# Patient Record
Sex: MALE | Race: BLACK OR AFRICAN AMERICAN | NOT HISPANIC OR LATINO | Employment: OTHER | URBAN - METROPOLITAN AREA
[De-identification: names, ages, dates, MRNs, and addresses within clinical notes are randomized per-mention and may not be internally consistent; named-entity substitution may affect disease eponyms.]

---

## 2025-07-11 ENCOUNTER — HOSPITAL ENCOUNTER (EMERGENCY)
Facility: HOSPITAL | Age: 70
Discharge: PSYCHIATRIC OR SUBSTANCE USE FACILITY | End: 2025-07-12
Attending: EMERGENCY MEDICINE

## 2025-07-11 ENCOUNTER — APPOINTMENT (OUTPATIENT)
Dept: CARDIOLOGY | Facility: HOSPITAL | Age: 70
End: 2025-07-11

## 2025-07-11 VITALS
HEART RATE: 84 BPM | SYSTOLIC BLOOD PRESSURE: 157 MMHG | OXYGEN SATURATION: 96 % | HEIGHT: 66 IN | WEIGHT: 212 LBS | BODY MASS INDEX: 34.07 KG/M2 | DIASTOLIC BLOOD PRESSURE: 90 MMHG | RESPIRATION RATE: 18 BRPM | TEMPERATURE: 97.3 F

## 2025-07-11 DIAGNOSIS — R45.851 DEPRESSION WITH SUICIDAL IDEATION: Primary | ICD-10-CM

## 2025-07-11 DIAGNOSIS — R45.851 SUICIDAL IDEATION: ICD-10-CM

## 2025-07-11 DIAGNOSIS — F32.A DEPRESSION WITH SUICIDAL IDEATION: Primary | ICD-10-CM

## 2025-07-11 LAB
ALBUMIN SERPL BCP-MCNC: 4 G/DL (ref 3.4–5)
ALP SERPL-CCNC: 65 U/L (ref 33–136)
ALT SERPL W P-5'-P-CCNC: 30 U/L (ref 10–52)
AMPHETAMINES UR QL SCN: NORMAL
ANION GAP SERPL CALCULATED.3IONS-SCNC: 10 MMOL/L (ref 10–20)
APAP SERPL-MCNC: <10 UG/ML (ref ?–30)
APPEARANCE UR: CLEAR
AST SERPL W P-5'-P-CCNC: 19 U/L (ref 9–39)
BARBITURATES UR QL SCN: NORMAL
BASOPHILS # BLD AUTO: 0.03 X10*3/UL (ref 0–0.1)
BASOPHILS NFR BLD AUTO: 0.4 %
BENZODIAZ UR QL SCN: NORMAL
BILIRUB SERPL-MCNC: 0.3 MG/DL (ref 0–1.2)
BILIRUB UR STRIP.AUTO-MCNC: NEGATIVE MG/DL
BUN SERPL-MCNC: 15 MG/DL (ref 6–23)
BZE UR QL SCN: NORMAL
CALCIUM SERPL-MCNC: 9.1 MG/DL (ref 8.6–10.3)
CANNABINOIDS UR QL SCN: NORMAL
CHLORIDE SERPL-SCNC: 106 MMOL/L (ref 98–107)
CO2 SERPL-SCNC: 25 MMOL/L (ref 21–32)
COLOR UR: NORMAL
CREAT SERPL-MCNC: 0.85 MG/DL (ref 0.5–1.3)
EGFRCR SERPLBLD CKD-EPI 2021: >90 ML/MIN/1.73M*2
EOSINOPHIL # BLD AUTO: 0.2 X10*3/UL (ref 0–0.7)
EOSINOPHIL NFR BLD AUTO: 2.6 %
ERYTHROCYTE [DISTWIDTH] IN BLOOD BY AUTOMATED COUNT: 13.2 % (ref 11.5–14.5)
ETHANOL SERPL-MCNC: <10 MG/DL
FENTANYL+NORFENTANYL UR QL SCN: NORMAL
GLUCOSE SERPL-MCNC: 100 MG/DL (ref 74–99)
GLUCOSE UR STRIP.AUTO-MCNC: NORMAL MG/DL
HCT VFR BLD AUTO: 40 % (ref 41–52)
HGB BLD-MCNC: 13.1 G/DL (ref 13.5–17.5)
IMM GRANULOCYTES # BLD AUTO: 0.01 X10*3/UL (ref 0–0.7)
IMM GRANULOCYTES NFR BLD AUTO: 0.1 % (ref 0–0.9)
KETONES UR STRIP.AUTO-MCNC: NEGATIVE MG/DL
LEUKOCYTE ESTERASE UR QL STRIP.AUTO: NEGATIVE
LYMPHOCYTES # BLD AUTO: 2.8 X10*3/UL (ref 1.2–4.8)
LYMPHOCYTES NFR BLD AUTO: 36.8 %
MCH RBC QN AUTO: 27.9 PG (ref 26–34)
MCHC RBC AUTO-ENTMCNC: 32.8 G/DL (ref 32–36)
MCV RBC AUTO: 85 FL (ref 80–100)
METHADONE UR QL SCN: NORMAL
MONOCYTES # BLD AUTO: 0.81 X10*3/UL (ref 0.1–1)
MONOCYTES NFR BLD AUTO: 10.7 %
NEUTROPHILS # BLD AUTO: 3.75 X10*3/UL (ref 1.2–7.7)
NEUTROPHILS NFR BLD AUTO: 49.4 %
NITRITE UR QL STRIP.AUTO: NEGATIVE
NRBC BLD-RTO: 0 /100 WBCS (ref 0–0)
OPIATES UR QL SCN: NORMAL
OXYCODONE+OXYMORPHONE UR QL SCN: NORMAL
PCP UR QL SCN: NORMAL
PH UR STRIP.AUTO: 7 [PH]
PLATELET # BLD AUTO: 169 X10*3/UL (ref 150–450)
POTASSIUM SERPL-SCNC: 4.1 MMOL/L (ref 3.5–5.3)
PROT SERPL-MCNC: 7.1 G/DL (ref 6.4–8.2)
PROT UR STRIP.AUTO-MCNC: NEGATIVE MG/DL
RBC # BLD AUTO: 4.7 X10*6/UL (ref 4.5–5.9)
RBC # UR STRIP.AUTO: NEGATIVE MG/DL
SALICYLATES SERPL-MCNC: <3 MG/DL (ref ?–20)
SODIUM SERPL-SCNC: 137 MMOL/L (ref 136–145)
SP GR UR STRIP.AUTO: 1.02
UROBILINOGEN UR STRIP.AUTO-MCNC: NORMAL MG/DL
WBC # BLD AUTO: 7.6 X10*3/UL (ref 4.4–11.3)

## 2025-07-11 PROCEDURE — 36415 COLL VENOUS BLD VENIPUNCTURE: CPT

## 2025-07-11 PROCEDURE — 80053 COMPREHEN METABOLIC PANEL: CPT

## 2025-07-11 PROCEDURE — 93005 ELECTROCARDIOGRAM TRACING: CPT

## 2025-07-11 PROCEDURE — 81003 URINALYSIS AUTO W/O SCOPE: CPT

## 2025-07-11 PROCEDURE — 85025 COMPLETE CBC W/AUTO DIFF WBC: CPT

## 2025-07-11 PROCEDURE — 99285 EMERGENCY DEPT VISIT HI MDM: CPT | Performed by: EMERGENCY MEDICINE

## 2025-07-11 PROCEDURE — 80307 DRUG TEST PRSMV CHEM ANLYZR: CPT

## 2025-07-11 PROCEDURE — 80320 DRUG SCREEN QUANTALCOHOLS: CPT

## 2025-07-11 SDOH — HEALTH STABILITY: MENTAL HEALTH: IN THE PAST WEEK, HAVE YOU BEEN HAVING THOUGHTS ABOUT KILLING YOURSELF?: YES

## 2025-07-11 SDOH — HEALTH STABILITY: MENTAL HEALTH: ANXIETY SYMPTOMS: GENERALIZED

## 2025-07-11 SDOH — HEALTH STABILITY: MENTAL HEALTH: IN THE PAST FEW WEEKS, HAVE YOU FELT THAT YOU OR YOUR FAMILY WOULD BE BETTER OFF IF YOU WERE DEAD?: YES

## 2025-07-11 SDOH — HEALTH STABILITY: MENTAL HEALTH: ARE YOU HAVING THOUGHTS OF KILLING YOURSELF RIGHT NOW?: YES

## 2025-07-11 SDOH — HEALTH STABILITY: MENTAL HEALTH: WISH TO BE DEAD (PAST 1 MONTH): YES

## 2025-07-11 SDOH — HEALTH STABILITY: MENTAL HEALTH: SUICIDAL BEHAVIOR (LIFETIME): NO

## 2025-07-11 SDOH — HEALTH STABILITY: MENTAL HEALTH: HAVE YOU EVER TRIED TO KILL YOURSELF?: NO

## 2025-07-11 SDOH — HEALTH STABILITY: MENTAL HEALTH: ACTIVE SUICIDAL IDEATION WITH SOME INTENT TO ACT, WITHOUT SPECIFIC PLAN (PAST 1 MONTH): NO

## 2025-07-11 SDOH — HEALTH STABILITY: MENTAL HEALTH: ACTIVE SUICIDAL IDEATION WITH SPECIFIC PLAN AND INTENT (PAST 1 MONTH): NO

## 2025-07-11 SDOH — HEALTH STABILITY: MENTAL HEALTH: IN THE PAST FEW WEEKS, HAVE YOU WISHED YOU WERE DEAD?: YES

## 2025-07-11 SDOH — HEALTH STABILITY: MENTAL HEALTH: NON-SPECIFIC ACTIVE SUICIDAL THOUGHTS (PAST 1 MONTH): YES

## 2025-07-11 SDOH — HEALTH STABILITY: MENTAL HEALTH
DEPRESSION SYMPTOMS: APPETITE CHANGE;SLEEP DISTURBANCE;FEELINGS OF HELPLESSNESS;FEELINGS OF HOPELESSESS;LOSS OF INTEREST;CHANGE IN ENERGY LEVEL

## 2025-07-11 SDOH — ECONOMIC STABILITY: HOUSING INSECURITY: FEELS SAFE LIVING IN HOME: YES

## 2025-07-11 ASSESSMENT — LIFESTYLE VARIABLES
PRESCIPTION_ABUSE_PAST_12_MONTHS: NO
SUBSTANCE_ABUSE_PAST_12_MONTHS: NO

## 2025-07-11 NOTE — ED PROVIDER NOTES
"HPI   Chief Complaint   Patient presents with    Suicidal       HPI  Patient is a 70-year-old male with history of anxiety depression and PTSD pink slipped [facility].  She reports she did have a follow-up visit with the patient this morning and he was very reserved and Stating \"he is not worthy of life\" and states that \"he has no will to live.\"  He reports thoughts of wanting to hurt himself but no active plan.  She reports that he was somewhat slow to speak and she is concerned that his depression is worsening to the point where he requires inpatient psychiatric hospitalization as she is concerned that he may act impulsively on his thoughts.  He otherwise denies any cough congestion recent fevers chills chest pain shortness of breath abdominal pain nausea vomiting or diarrhea.  He reports overall no desire to live but no active plan for suicide at this time.      Patient History   Medical History[1]  Surgical History[2]  Family History[3]  Social History[4]    Physical Exam   ED Triage Vitals [07/11/25 1352]   Temperature Heart Rate Respirations BP   36.8 °C (98.2 °F) 77 20 (!) 185/92      Pulse Ox Temp src Heart Rate Source Patient Position   96 % -- -- --      BP Location FiO2 (%)     -- --       Physical Exam  Vitals and nursing note reviewed.   Constitutional:       General: He is not in acute distress.     Appearance: He is well-developed.   HENT:      Head: Normocephalic and atraumatic.   Eyes:      Conjunctiva/sclera: Conjunctivae normal.   Cardiovascular:      Rate and Rhythm: Normal rate and regular rhythm.      Heart sounds: No murmur heard.  Pulmonary:      Effort: Pulmonary effort is normal. No respiratory distress.      Breath sounds: Normal breath sounds.   Abdominal:      Palpations: Abdomen is soft.      Tenderness: There is no abdominal tenderness.   Musculoskeletal:         General: No swelling.      Cervical back: Neck supple.   Skin:     " "General: Skin is warm and dry.      Capillary Refill: Capillary refill takes less than 2 seconds.   Neurological:      Mental Status: He is alert.   Psychiatric:      Comments: Depressed mood           ED Course & MDM   ED Course as of 07/13/25 1608   Fri Jul 11, 2025   1400 I did speak with the [facility] who did [facility] him due to worsening depression she is concerned he Persistently stating that he \"is not worthy\" and \"no longer wants to live\" she states he did not express that he had a plan but he was very depressed and she feels that he is at high risk for impulsive behaviors and requires higher care at this time. [JJ]   1506 Twelve-lead EKG notes normal sinus rhythm 74 beats minute.  First-degree AV block.  Nonspecific T wave flattening inferiorly but no ST elevations or depressions [ML]      ED Course User Index  [JJ] Faviola Muñoz PA-C  [ML] Marty R Lejeune,          Diagnoses as of 07/13/25 1608   Depression with suicidal ideation   Suicidal ideation                 No data recorded     Gab Coma Scale Score: 15 (07/11/25 1348 : Cheyenne Duran LPN)                           Medical Decision Making  Parts of this chart have been completed using voice recognition software. Please excuse any errors of transcription.  My thought process and reason for plan has been formulated from the time that I saw the patient until the time of disposition and is not specific to one specific moment during their visit and furthermore my MDM encompasses this entire chart and not only this text box.      HPI: Detailed above.    Exam: A medically appropriate exam performed, outlined above, given the known history and presentation.    History obtained from: Patient    EKG: Interpreted by attending physician and reviewed by me    Social Determinants of Health considered during this visit: Lives independently, pink slipped    Medications given during visit:  Medications - No data to display "     Diagnostic/tests  Labs Reviewed   CBC WITH AUTO DIFFERENTIAL - Abnormal       Result Value    WBC 7.6      nRBC 0.0      RBC 4.70      Hemoglobin 13.1 (*)     Hematocrit 40.0 (*)     MCV 85      MCH 27.9      MCHC 32.8      RDW 13.2      Platelets 169      Neutrophils % 49.4      Immature Granulocytes %, Automated 0.1      Lymphocytes % 36.8      Monocytes % 10.7      Eosinophils % 2.6      Basophils % 0.4      Neutrophils Absolute 3.75      Immature Granulocytes Absolute, Automated 0.01      Lymphocytes Absolute 2.80      Monocytes Absolute 0.81      Eosinophils Absolute 0.20      Basophils Absolute 0.03     COMPREHENSIVE METABOLIC PANEL - Abnormal    Glucose 100 (*)     Sodium 137      Potassium 4.1      Chloride 106      Bicarbonate 25      Anion Gap 10      Urea Nitrogen 15      Creatinine 0.85      eGFR >90      Calcium 9.1      Albumin 4.0      Alkaline Phosphatase 65      Total Protein 7.1      AST 19      Bilirubin, Total 0.3      ALT 30     DRUG SCREEN,URINE - Normal    Amphetamine Screen, Urine Presumptive Negative      Barbiturate Screen, Urine Presumptive Negative      Benzodiazepines Screen, Urine Presumptive Negative      Cannabinoid Screen, Urine Presumptive Negative      Cocaine Metabolite Screen, Urine Presumptive Negative      Fentanyl Screen, Urine Presumptive Negative      Opiate Screen, Urine Presumptive Negative      Oxycodone Screen, Urine Presumptive Negative      PCP Screen, Urine Presumptive Negative      Methadone Screen, Urine Presumptive Negative      Narrative:     Drug screen results are presumptive and should not be used to assess   compliance with prescribed medication. Contact the performing [facility] laboratory   to add-on definitive confirmatory testing if clinically indicated.    Toxicology screening results are reported qualitatively. The concentration must   be greater than or equal to the cutoff to be reported as positive. The concentration   at which the screening test can  detect an individual drug or metabolite varies.   The absence of expected drug(s) and/or drug metabolite(s) may indicate non-compliance,   inappropriate timing of specimen collection relative to drug administration, poor drug   absorption, diluted/adulterated urine, or limitations of testing. For medical purposes   only; not valid for forensic use.    Interpretive questions should be directed to the laboratory medical directors.   ACUTE TOXICOLOGY PANEL, BLOOD - Normal    Acetaminophen <10.0      Salicylate  <3      Alcohol <10     URINALYSIS WITH REFLEX CULTURE AND MICROSCOPIC - Normal    Color, Urine Light-Yellow      Appearance, Urine Clear      Specific Gravity, Urine 1.022      pH, Urine 7.0      Protein, Urine NEGATIVE      Glucose, Urine Normal      Blood, Urine NEGATIVE      Ketones, Urine NEGATIVE      Bilirubin, Urine NEGATIVE      Urobilinogen, Urine Normal      Nitrite, Urine NEGATIVE      Leukocyte Esterase, Urine NEGATIVE     URINALYSIS WITH REFLEX CULTURE AND MICROSCOPIC    Narrative:     The following orders were created for panel order Urinalysis with Reflex Culture and Microscopic.  Procedure                               Abnormality         Status                     ---------                               -----------         ------                     Urinalysis with Reflex Cu...[[id number]]  Normal              Final result               Extra Urine Gray Tube[[id number]]                             In process                   Please view results for these tests on the individual orders.   EXTRA URINE GRAY TUBE      No orders to display        Considerations/further MDM:  Patient is a 70-year-old male presenting for evaluation of depression, suicidal ideation    I saw this patient in conjunction with Dr. Lejeune.  Patient is awake alert depressed but otherwise clinically nontoxic-appearing.  He has no focal neurologic deficits and no acute symptoms to suggest metabolic or infectious etiology of his  symptoms at this time.  Obtained CBC, CMP, acute tox panel, urinalysis and urine drug screen.  Workup is with mild anemia otherwise unremarkable.  EKG is nonischemic.  Patient is medically cleared for inpatient psychiatric hospitalization.  Pending crisis intervention team recommendations for further care.  It has reached the end of my shift and results are still pending.  From this point onward patient care will be given by dr. lejeune.  Please refer to his note for additional details regarding the remainder of the patient visit and disposition.        Procedure  Procedures       Faviola Muñoz PA-C  07/11/25 1624       [1] No past medical history on file.  [2] No past surgical history on file.  [3] No family history on file.  [4]   Social History  Tobacco Use    Smoking status: Not on file    Smokeless tobacco: Not on file   Substance Use Topics    Alcohol use: Not on file    Drug use: Not on file        Faviola Muñoz PA-C  07/13/25 1602

## 2025-07-11 NOTE — PROGRESS NOTES
EPAT - Social Work Psychiatric Assessment    Arrival Details  Mode of Arrival: Ambulatory  Admission Source: Home  Admission Type: Involuntary  EPAT Assessment Start Date: 07/11/25  EPAT Assessment Start Time: 1612  Name of : Sb Izquierdoeen    History of Present Illness  Admission Reason: Suicidal Ideation/Nightmares/Poor sleep.  HPI: Patient is a 70 year old male who presented to the ED [facility]. He was pink slipped for increased depression,  poor sleep, nightmares and suicidal ideation. A review of his Triage, Provider and [place] was conducted.    SW Readmission Information   Readmission within 30 Days: No    Psychiatric Symptoms  Anxiety Symptoms: Generalized  Depression Symptoms: Appetite change, Sleep disturbance, Feelings of helplessness, Feelings of hopelessess, Loss of interest, Change in energy level  Tanya Symptoms: No problems reported or observed.    Psychosis Symptoms  Hallucination Type: No problems reported or observed.  Delusion Type: No problems reported or observed.    Additional Symptoms - Adult  Generalized Anxiety Disorder: Excessive anxiety/worry  Obsessive Compulsive Disorder: No problems reported or observed.  Panic Attack: No problems reported or observed.  Post Traumatic Stress Disorder: Re-living event  Delirium: No problems reported or observed.  Review of Symptoms Comments: Please see above    Past Psychiatric History/Meds/Treatments  Past Psychiatric History: Patient has a history of PTSD, Depression and Anxiety. [facility] He has no history of past suicide attempts or substance treatment. He denied any history of patient inpatient stays for psychiatry.  Past Psychiatric Meds/Treatments: see med list.  Past Violence/Victimization History: none    Current Mental Health Contacts   [facility]   Last Appointment Date: today  Provider [facility]  Provider Last Appointment Date:  today    Support System: Immediate family    Living Arrangement: House    Home Safety  Feels Safe Living in Home: Yes         [place] Service/Education History  Current or Previous  Service: Active duty, past   Experience: Experienced combat  Education Level: High school  History of Learning Problems: No  History of School Behavior Problems: No  School History: see above    Social/Cultural History  Social History: Patient is his own guardian.  Important Activities: Family    Legal  Legal Concerns: none    Drug Screening  Have you used any substances (canabis, cocaine, heroin, hallucinogens, inhalants, etc.) in the past 12 months?: No  Have you used any prescription drugs other than prescribed in the past 12 months?: No  Is a toxicology screen needed?: No         Behavioral Health  Behavioral Health(WDL): Exceptions to WDL  Behaviors/Mood: Anxious, Cooperative, Flat affect, Guarded  Affect: Inconsistent with mood  Parent/Guardian/Significant Other Involvement: No involvement    Orientation  Orientation Level: Oriented X4    General Appearance  Motor Activity: Unremarkable  Speech Pattern: Other (Comment)  General Attitude: Cooperative, Guarded  Appearance/Hygiene: Unremarkable    Thought Process  Coherency: Other (Comment)  Content: Unremarkable  Delusions: Other (Comment)  Perception: Not altered  Hallucination: None  Judgment/Insight: Limited  Confusion: None  Cognition: Follows commands    Sleep Pattern  Sleep Pattern: Difficulty falling asleep    Risk Factors  Self Harm/Suicidal Ideation Plan: Suicidal ideation  Previous Self Harm/Suicidal Plans: patient denies  Risk Factors: None    Violence Risk Assessment  Assessment of Violence: None noted  Thoughts of Harm to Others: No    Ability to Assess Risk Screen  Risk Screen - Ability to Assess: Able to be screened  Ask Suicide-Screening Questions  1. In the past few weeks, have you wished you were dead?: Yes  2. In the past few weeks, have you felt  that you or your family would be better off if you were dead?: Yes  3. In the past week, have you been having thoughts about killing yourself?: Yes  4. Have you ever tried to kill yourself?: No  5. Are you having thoughts of killing yourself right now?: Yes  Calculated Risk Score: Imminent Risk  [place] Suicide Severity Rating Scale (Screener/Recent Self-Report)  1. Wish to be Dead (Past 1 Month): Yes  2. Non-Specific Active Suicidal Thoughts (Past 1 Month): Yes  3. Active Suicidal Ideation with any Methods (Not Plan) Without Intent to Act (Past 1 Month): Yes  4. Active Suicidal Ideation with Some Intent to Act, Without Specific Plan (Past 1 Month): No  5. Active Suicidal Ideation with Specific Plan and Intent (Past 1 Month): No  6. Suicidal Behavior (Lifetime): No  Calculated C-SSRS Risk Score (Lifetime/Recent): Moderate Risk  Step 1: Risk Factors  Current & Past Psychiatric Dx: Mood disorder  Presenting Symptoms: Impulsivity, Anxiety and/or panic, Hopelessness or despair  Family History: Other (Comment)  Precipitants/Stressors: Triggering events leading to humiliation, shame, and/or despair (e.g. loss of relationship, financial or health status) (real or anticipated), Social isolation  Change in Treatment: Hopeless or dissatisfied with provider or treatment  Access to Lethal Methods : No  Step 2: Protective Factors   Protective Factors Internal: Ability to cope with stress  Protective Factors External: Cultural, spiritual and/or moral attitudes against suicide, Supportive social network or family or friends, Positive therapeutic relationships  Step 3: Suicidal Ideation Intensity  How Many Times Have You Had These Thoughts: Once a week  When You Have the Thoughts How Long do They Last : Less than 1 hour/some of the time  Could/Can You Stop Thinking About Killing Yourself or Wanting to Die if You Want to: Can control thoughts with some difficulty  Are There Things - Anyone or Anything - That Stopped You From  "Wanting to Die or Acting on: Uncertain that deterrents stopped you  What Sort of Reasons Did You Have For Thinking About Wanting to Die or Killing Yourself: Equally to get attention, revenge or a reaction from others and to end/stop the pain  Total Score: 13  Step 5: Documentation  Risk Level: High suicide risk (Patient is high risk based on his recently Suicidal Ideation and inability to contract for safety as well as guarded presentation and flat affect. Dr. Lejeune agrees.)    Psychiatric Impression and Plan of Care  Assessment and Plan: Patient is a 70 year male who [facility]. The patient stated at the office increased suicidal thoughts, \"not wanting to live anymore\", poor sleep. His Psychiatrist pink slipped him to the ED concerned for his  depression and thoughts of self harm. During the assessment the patient was guarded and avoided eye contact. He reported having nightmares and poor sleep. When asked about his suicidal thoughts he admitted to them but did not respond when asked about a plan. He reported recent stressors with selling his home, moving in with his sister and finances. He had little insight and poor body language. His Psychiatrist told the provider she has worked with him for a long time and is concerned due to his impulsivity, poor sleep, nightmares and thoughts of self harm. The patient denied any homicidal thoughts or hallucinations. [facility].  Specific Resources Provided to Patient: [facility]  CM Notified: yes  PHP/IOP Recommended: none  Specific Information Provided for PHP/IOP: none  Plan Comments: inpatient    Outcome/Disposition  Patient's Perception of Outcome Achieved: n/a  Assessment, Recommendations and Risk Level Reviewed with: inpatient  Contact Name: Jo Ann Isabel  Contact Number(s): [phone]  Contact Relationship: daughter  EPAT Assessment Completed Date: 07/11/25  EPAT Assessment Completed Time: 1643    Social Work " Note

## 2025-07-16 LAB
ATRIAL RATE: 75 BPM
P AXIS: 62 DEGREES
P OFFSET: 165 MS
P ONSET: 98 MS
PR INTERVAL: 224 MS
Q ONSET: 210 MS
QRS COUNT: 13 BEATS
QRS DURATION: 100 MS
QT INTERVAL: 380 MS
QTC CALCULATION(BAZETT): 424 MS
QTC FREDERICIA: 409 MS
R AXIS: -28 DEGREES
T AXIS: 64 DEGREES
T OFFSET: 400 MS
VENTRICULAR RATE: 75 BPM